# Patient Record
Sex: MALE | Race: WHITE | Employment: OTHER | URBAN - METROPOLITAN AREA
[De-identification: names, ages, dates, MRNs, and addresses within clinical notes are randomized per-mention and may not be internally consistent; named-entity substitution may affect disease eponyms.]

---

## 2017-01-03 ENCOUNTER — HOSPITAL ENCOUNTER (OUTPATIENT)
Dept: WOUND CARE | Age: 72
Discharge: HOME | End: 2017-01-03
Payer: MEDICARE

## 2017-01-03 PROCEDURE — 97597 DBRDMT OPN WND 1ST 20 CM/<: CPT | Performed by: PODIATRIST

## 2017-01-03 PROCEDURE — 97602 WOUND(S) CARE NON-SELECTIVE: CPT | Performed by: PODIATRIST

## 2017-01-03 RX ORDER — LIDOCAINE HYDROCHLORIDE 20 MG/ML
JELLY TOPICAL AS NEEDED
Status: DISCONTINUED | OUTPATIENT
Start: 2017-01-03 | End: 2017-01-07 | Stop reason: HOSPADM

## 2017-01-03 RX ORDER — GLUCOSAMINE/CHONDR SU A SOD 750-600 MG
5 TABLET ORAL
COMMUNITY

## 2017-01-03 RX ADMIN — LIDOCAINE HYDROCHLORIDE: 20 JELLY TOPICAL at 14:00

## 2017-01-03 NOTE — PROGRESS NOTES
Wound Center  Progress Note      Subjective:   Abdi Hargrove is a 80 y.o.  male for follow up new onset right foot and calf ulceration, and prior left TMA. Denies overnight n/f/v/c.      Offloading wound: no  Appetite: good  Wound associated pain: mild  Diabetic: yes  Smoker: no      There have been no changes in patient's medical history in the interim.      ROS:  No fever or chills. No rash. No pain at site of wound      Objective:   Vital Signs: T: 97.2 P: 72 RR: 20 BP: 117/71  General: alert, well developed, cooperative or pleasant  Psych: Cooperative, no anxiety or depression  Neuro: Alert, oriented to person/place/situation. Otherwise nonfocal.  Extremities: Bilateral moderate pitting edema is noted. Calves are supple and nontender. No cording. Hair present. Skin color is darkened hue. Vascular exam: Capillary refill is intact, <3sec. R DP pulse : 1+  R PT pulse: 0  L DP pulse : 1+  L PT pulse: 0          Ulcer Description:   Etiology: combined  Location: R lower leg lateral  Measurement: 7.3 x 1.6 x 0.2 cm  Ulcer bed: Granular/Healthy   Periwound: Edematous  Exudate: None: wound tissue dry  Odor: none      Ulcer Description:   Etiology: combined  Location: Lt. foot dorsal  Measurement: 1 x 0.8 x 0.1 cm  Ulcer bed: Mixed Granular/Fibrotic   Periwound: Edematous, nontender  Exudate: None: wound tissue dry  Odor: none      Ulcer Description:   Etiology: combined  Location: L heel  Measurement: 0.9 x 1.4 x 0.3 cm  Ulcer bed: Mixed Granular/Fibrotic   Periwound: Edematous, nontender  Exudate: None: wound tissue dry  Odor: none          Assessment:  80 y.o. 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       Plan:  1. Dressing: 3 layer compression wrap with Santyl Frequency: three times a week  2.  Debridement included excisional debridement skin to include sub Q tissues with curette         Plan is reviewed with patient who expresses understanding. Questions were answered. Patient is to follow up with me in 1 wk.

## 2017-01-12 ENCOUNTER — HOSPITAL ENCOUNTER (OUTPATIENT)
Dept: WOUND CARE | Age: 72
Discharge: HOME | End: 2017-01-12
Payer: MEDICARE

## 2017-01-12 PROCEDURE — 97597 DBRDMT OPN WND 1ST 20 CM/<: CPT

## 2017-01-12 RX ORDER — LIDOCAINE HYDROCHLORIDE 20 MG/ML
JELLY TOPICAL AS NEEDED
Status: DISCONTINUED | OUTPATIENT
Start: 2017-01-12 | End: 2017-01-16 | Stop reason: HOSPADM

## 2017-01-12 RX ADMIN — LIDOCAINE HYDROCHLORIDE: 20 JELLY TOPICAL at 10:00

## 2017-01-12 NOTE — PROGRESS NOTES
[id number]   WOUND CARE PROGRESS NOTE       Name:  Michel iVck   MR#:  [id number]   :  1945   Account #:  [de-identified]        Date of Adm:  2017       DATE OF SERVICE:  2017    HISTORY OF PRESENT ILLNESS: The patient follows up for bilateral   lower extremity wounds. He is normally followed by Dr. Cayden Guadalupe but   missed his appointment 2 days ago due to the weather. He is being   treated with 3-layer compression wrap to the right lower leg and foot   with Aquacel AG applied to the wound bed. He is treating the left heel   and dorsal foot wounds with Santyl ointment and a dry dressing daily   with Tubigrip applied over that. He has no complaints and no change in   his history since his last visit. PHYSICAL EXAMINATION   GENERAL: On exam, he is a pleasant, obese male. VITAL SIGNS: Afebrile with stable vital signs. SKIN: There is an ulcer on the lateral right distal lower leg which   measures 6.4 x 1.4 x 0.4 cm today, compared to 7.3 x 1.6 x 0.4 cm last   week. The wound is 50% clean granulation and 50% yellow or brown   nonviable tissue. The wound was selectively debrided using a curette   to scrape away and cut away the nonviable tissue. Following   debridement, the wound is 100% clean granulation and extends into   the exposed subcutaneous fat. Aquacel AG was applied to the wound   and a 3-layer compression wrap was applied to the right lower leg and   foot. There is an ulcer on the left heel which measures 1.0 x 1.8 x 0.4 cm   today, compared to last week when it was 0.9 x 1.4 x 0.3 cm. The   wound is 50% clean granulation and 50% yellow or tan nonviable fat. The wound was selectively debrided using a curette to scrape away   and cut away the nonviable tissue. Following debridement, the wound   is 80% granulation and 20% clean, pale fat. The wound extends into   the exposed subcutaneous fat layer. On the dorsum of the left foot, there is a small ulcer which measures   0.7 x 0.7 x 0.2 cm. This is smaller than last week. The wound is 100%   clean granulation and extends into the exposed subcutaneous fat   layer. Santyl ointment, dry dressings, and a Tubigrip were applied to   the left foot wounds in lower leg. ASSESSMENT AND PLAN: The patient is making good progress with   his current wound care. He will continue this and follow up with Dr. Bull Sommer next week to change the wrap and check the wounds.          Azeem Suárez (Cheri Morrison) Etha Gitelman, MD      WT / ALAYNA   D:  01/12/2017   12:26   T:  01/12/2017   13:10   Job #:  [id number]

## 2017-01-17 ENCOUNTER — HOSPITAL ENCOUNTER (OUTPATIENT)
Dept: WOUND CARE | Age: 72
Discharge: HOME | End: 2017-01-17
Payer: MEDICARE

## 2017-01-17 PROCEDURE — 97602 WOUND(S) CARE NON-SELECTIVE: CPT | Performed by: PODIATRIST

## 2017-01-17 PROCEDURE — 97597 DBRDMT OPN WND 1ST 20 CM/<: CPT | Performed by: PODIATRIST

## 2017-01-17 RX ORDER — LIDOCAINE HYDROCHLORIDE 20 MG/ML
JELLY TOPICAL AS NEEDED
Status: DISCONTINUED | OUTPATIENT
Start: 2017-01-17 | End: 2017-01-21 | Stop reason: HOSPADM

## 2017-01-17 RX ADMIN — LIDOCAINE HYDROCHLORIDE: 20 JELLY TOPICAL at 14:25

## 2017-01-17 NOTE — PROGRESS NOTES
Wound Center  Progress Note      Subjective:   Hayley Montes is a 80 y.o.  male for follow up new onset right foot and calf ulceration, and prior left TMA. Denies overnight n/f/v/c.      Offloading wound: no  Appetite: good  Wound associated pain: mild  Diabetic: yes  Smoker: no      There have been no changes in patient's medical history in the interim.      ROS:  No fever or chills. No rash. No pain at site of wound      Objective:   Vital Signs: T: 97.6P: 67 RR: 18 BP: 127/69  General: alert, well developed, cooperative or pleasant  Psych: Cooperative, no anxiety or depression  Neuro: Alert, oriented to person/place/situation. Otherwise nonfocal.  Extremities: Bilateral moderate pitting edema is noted. Calves are supple and nontender. No cording. Hair present. Skin color is darkened hue. Vascular exam: Capillary refill is intact, <3sec. R DP pulse : 1+  R PT pulse: 0  L DP pulse : 1+  L PT pulse: 0          Ulcer Description:   Etiology: combined  Location: R lower leg lateral  Measurement: 6.6 x 1.1 x 0.2 cm  Ulcer bed: Granular/Healthy   Periwound: Edematous  Exudate: None: wound tissue dry  Odor: none      Ulcer Description:   Etiology: combined  Location: Lt. foot dorsal  Measurement: 0.9 x 0.7 x 0.1 cm  Ulcer bed: Mixed Granular/Fibrotic   Periwound: Edematous, nontender  Exudate: None: wound tissue dry  Odor: none      Ulcer Description:   Etiology: combined  Location: L heel  Measurement: 1.3 x 1.9 x 0.3 cm  Ulcer bed: Mixed Granular/Fibrotic   Periwound: Edematous, nontender  Exudate: None: wound tissue dry  Odor: none          Assessment:  80 y.o. 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       Plan:  1. Dressing: 3 layer compression wrap with Santyl Frequency: three times a week  2.  Debridement included excisional debridement skin to include sub Q tissues with curette         Plan is reviewed with patient who expresses understanding. Questions were answered. Patient is to follow up with me in 1 wk.

## 2017-01-24 ENCOUNTER — HOSPITAL ENCOUNTER (OUTPATIENT)
Dept: WOUND CARE | Age: 72
Discharge: HOME | End: 2017-01-24
Payer: MEDICARE

## 2017-01-24 PROCEDURE — 97597 DBRDMT OPN WND 1ST 20 CM/<: CPT | Performed by: PODIATRIST

## 2017-01-24 NOTE — PROGRESS NOTES
Wound Center  Progress Note      Subjective:   Angela Bass is a 80 y.o.  male for follow up new onset right foot and calf ulceration, and prior left TMA. Denies overnight n/f/v/c.      Offloading wound: no  Appetite: good  Wound associated pain: mild  Diabetic: yes  Smoker: no      There have been no changes in patient's medical history in the interim.      ROS:  No fever or chills. No rash. No pain at site of wound      Objective:   Vital Signs: T: 97.2 P: 77 RR: 20 BP: 118/72  General: alert, well developed, cooperative or pleasant  Psych: Cooperative, no anxiety or depression  Neuro: Alert, oriented to person/place/situation. Otherwise nonfocal.  Extremities: Bilateral moderate pitting edema is noted. Calves are supple and nontender. No cording. Hair present. Skin color is darkened hue. Vascular exam: Capillary refill is intact, <3sec. R DP pulse : 1+  R PT pulse: 0  L DP pulse : 1+  L PT pulse: 0          Ulcer Description:   Etiology: combined  Location: R lower leg lateral  Measurement: 0.6 x 1.1 x 0.2 cm  Ulcer bed: Granular/Healthy   Periwound: Edematous  Exudate: None: wound tissue dry  Odor: none      Ulcer Description:   Etiology: combined  Location: Lt. foot dorsal  Measurement: 0.9 x 0.8 x 0.1 cm  Ulcer bed: Mixed Granular  Periwound: Edematous, nontender  Exudate: None: wound tissue dry  Odor: none      Ulcer Description:   Etiology: combined  Location: L heel  Measurement: 1.5 x 1.7 x 0.3 cm  Ulcer bed: Mixed Granular/Fibrotic   Periwound: Edematous, nontender  Exudate: None: wound tissue dry  Odor: none          Assessment:  80 y.o. 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       Plan:  1. Dressing: 3 layer compression wrap with Santyl Frequency: three times a week  2. Debridement included excisional debridement skin to include sub Q tissues with curette         Plan is reviewed with patient who expresses understanding. Questions were answered. Patient is to follow up with me in 2 wks.

## 2017-01-31 ENCOUNTER — HOSPITAL ENCOUNTER (OUTPATIENT)
Dept: WOUND CARE | Age: 72
Discharge: HOME | End: 2017-01-31
Payer: MEDICARE

## 2017-01-31 PROCEDURE — 97597 DBRDMT OPN WND 1ST 20 CM/<: CPT | Performed by: PODIATRIST

## 2017-01-31 RX ORDER — LIDOCAINE HYDROCHLORIDE 40 MG/ML
SOLUTION TOPICAL ONCE
Status: COMPLETED | OUTPATIENT
Start: 2017-01-31 | End: 2017-01-31

## 2017-01-31 RX ADMIN — LIDOCAINE HYDROCHLORIDE: 40 SOLUTION TOPICAL at 15:00

## 2017-01-31 NOTE — PROGRESS NOTES
Wound Center  Progress Note      Subjective:   Pieter Amaya is a 80 y.o.  male for follow up new onset right foot and calf ulceration, and prior left TMA. Denies overnight n/f/v/c.      Offloading wound: no  Appetite: good  Wound associated pain: mild  Diabetic: yes  Smoker: no      There have been no changes in patient's medical history in the interim.      ROS:  No fever or chills. No rash. No pain at site of wound      Objective:   Vital Signs: T: 97.5 P: 78 RR: 17 BP: 92/65  General: alert, well developed, cooperative or pleasant  Psych: Cooperative, no anxiety or depression  Neuro: Alert, oriented to person/place/situation. Otherwise nonfocal.  Extremities: Bilateral moderate pitting edema is noted. Calves are supple and nontender. No cording. Hair present. Skin color is darkened hue. Vascular exam: Capillary refill is intact, <3sec. R DP pulse : 1+  R PT pulse: 0  L DP pulse : 1+  L PT pulse: 0          Ulcer Description:   Etiology: combined  Location: Lt. foot dorsal  Measurement: 1.5 x 0.8 x 0.1 cm  Ulcer bed: Mixed Granular  Periwound: Edematous, nontender  Exudate: None: wound tissue dry  Odor: none      Ulcer Description:   Etiology: combined  Location: L heel  Measurement: 1.6 x 1.9 x 0.3 cm  Ulcer bed: Mixed Granular/Fibrotic   Periwound: Edematous, nontender  Exudate: None: wound tissue dry  Odor: none          Assessment:  80 y.o. 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       Plan:  1. Dressing: 3 layer compression wrap with Santyl Frequency: three times a week  2. Debridement included excisional debridement skin to include sub Q tissues with curette         Plan is reviewed with patient who expresses understanding. Questions were answered. Patient is to follow up with me in 2 wks.

## 2017-02-07 ENCOUNTER — HOSPITAL ENCOUNTER (OUTPATIENT)
Dept: WOUND CARE | Age: 72
Discharge: HOME | End: 2017-02-07
Payer: MEDICARE

## 2017-02-07 PROBLEM — L97.303: Status: ACTIVE | Noted: 2017-02-07

## 2017-02-07 PROCEDURE — 11043 DBRDMT MUSC&/FSCA 1ST 20/<: CPT | Performed by: PODIATRIST

## 2017-02-07 RX ORDER — LIDOCAINE HYDROCHLORIDE 20 MG/ML
JELLY TOPICAL AS NEEDED
Status: DISCONTINUED | OUTPATIENT
Start: 2017-02-07 | End: 2017-02-11 | Stop reason: HOSPADM

## 2017-02-07 RX ADMIN — LIDOCAINE HYDROCHLORIDE: 20 JELLY TOPICAL at 14:24

## 2017-02-07 NOTE — PROGRESS NOTES
Wound Center  Progress Note      Subjective:   Corazon Mansfield is a 80 y.o.  male for follow up new onset right foot and calf ulceration, and prior left TMA. Denies overnight n/f/v/c.      Offloading wound: no  Appetite: good  Wound associated pain: mild  Diabetic: yes  Smoker: no      There have been no changes in patient's medical history in the interim.      ROS:  No fever or chills. No rash. No pain at site of wound      Objective:   Vital Signs: T: 97.8 P: 89 RR: 19 BP: 105/64  General: alert, well developed, cooperative or pleasant  Psych: Cooperative, no anxiety or depression  Neuro: Alert, oriented to person/place/situation. Otherwise nonfocal.  Extremities: Bilateral moderate pitting edema is noted. Calves are supple and nontender. No cording. Hair present. Skin color is darkened hue. Vascular exam: Capillary refill is intact, <3sec. R DP pulse : 1+  R PT pulse: 0  L DP pulse : 1+  L PT pulse: 0          Ulcer Description:   Etiology: combined  Location: Lt. foot dorsal  Measurement: 0.8 x 0.8 x 0.1 cm  Ulcer bed: Mixed Granular  Periwound: Edematous, nontender  Exudate: None: wound tissue dry  Odor: none      Ulcer Description:   Etiology: combined  Location: L heel  Measurement: 1.2x 1.6 x 0.3 cm  Ulcer bed: Mixed Granular/Fibrotic   Periwound: Edematous, nontender  Exudate: None: wound tissue dry  Odor: none          Assessment:  80 y.o. 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       Plan:  1. Dressing: 3 layer compression wrap with Santyl Frequency: three times a week  2. Debridement included excisional debridement skin to include sub Q tissues with curette         Plan is reviewed with patient who expresses understanding. Questions were answered. Patient is to follow up with me in 2 wks.

## 2017-02-14 ENCOUNTER — HOSPITAL ENCOUNTER (OUTPATIENT)
Dept: WOUND CARE | Age: 72
Discharge: HOME | End: 2017-02-14
Payer: MEDICARE

## 2017-02-14 PROCEDURE — 97597 DBRDMT OPN WND 1ST 20 CM/<: CPT | Performed by: PODIATRIST

## 2017-02-14 PROCEDURE — 29581 APPL MULTLAYER CMPRN SYS LEG: CPT | Performed by: PODIATRIST

## 2017-02-21 ENCOUNTER — HOSPITAL ENCOUNTER (OUTPATIENT)
Dept: WOUND CARE | Age: 72
Discharge: HOME | End: 2017-02-21
Payer: MEDICARE

## 2017-02-21 PROCEDURE — 29581 APPL MULTLAYER CMPRN SYS LEG: CPT | Performed by: PODIATRIST

## 2017-02-21 PROCEDURE — 97597 DBRDMT OPN WND 1ST 20 CM/<: CPT | Performed by: PODIATRIST

## 2017-02-21 RX ORDER — LIDOCAINE HYDROCHLORIDE 20 MG/ML
JELLY TOPICAL ONCE
Status: COMPLETED | OUTPATIENT
Start: 2017-02-21 | End: 2017-02-21

## 2017-02-21 RX ADMIN — LIDOCAINE HYDROCHLORIDE: 20 JELLY TOPICAL at 13:45

## 2017-02-21 NOTE — PROGRESS NOTES
Wound Center  Progress Note      Subjective:   Emely Swanson is a 80 y.o.  male for follow up new onset right foot and calf ulceration, and prior left TMA. Denies overnight n/f/v/c.      Offloading wound: no  Appetite: good  Wound associated pain: mild  Diabetic: yes  Smoker: no      There have been no changes in patient's medical history in the interim.      ROS:  No fever or chills. No rash. No pain at site of wound      Objective:   Vital Signs: T: 97.2 P: 87 RR: 18 BP: 110/60  General: alert, well developed, cooperative or pleasant  Psych: Cooperative, no anxiety or depression  Neuro: Alert, oriented to person/place/situation. Otherwise nonfocal.  Extremities: Bilateral moderate pitting edema is noted. Calves are supple and nontender. No cording. Hair present. Skin color is darkened hue. Vascular exam: Capillary refill is intact, <3sec. R DP pulse : 1+  R PT pulse: 0  L DP pulse : 1+  L PT pulse: 0          Ulcer Description:   Etiology: combined  Location: Lt. foot dorsal  Measurement: 1.4 x 0.8 x 0.1 cm  Ulcer bed: Mixed Granular  Periwound: Edematous, nontender  Exudate: None: wound tissue dry  Odor: none      Ulcer Description:   Etiology: combined  Location: L heel  Measurement: 1.2 x 0.9 x 0.1 cm  Ulcer bed: Mixed Granular/Fibrotic   Periwound: Edematous, nontender  Exudate: None: wound tissue dry  Odor: none          Assessment:  80 y.o. 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       Plan:  1. Dressing: 3 layer compression wrap with Santyl Frequency: three times a week  2. Debridement included excisional debridement skin to include sub Q tissues with curette         Plan is reviewed with patient who expresses understanding. Questions were answered.   Patient is to follow up with me in 1 wk

## 2017-02-28 ENCOUNTER — HOSPITAL ENCOUNTER (OUTPATIENT)
Dept: WOUND CARE | Age: 72
Discharge: HOME | End: 2017-02-28
Payer: MEDICARE

## 2017-02-28 PROCEDURE — 29581 APPL MULTLAYER CMPRN SYS LEG: CPT | Performed by: PODIATRIST

## 2017-02-28 PROCEDURE — 97597 DBRDMT OPN WND 1ST 20 CM/<: CPT | Performed by: PODIATRIST

## 2017-02-28 RX ORDER — WARFARIN 3 MG/1
3 TABLET ORAL DAILY
COMMUNITY

## 2017-02-28 NOTE — PROGRESS NOTES
Wound Center  Progress Note      Subjective:   Anjel Mendez is a 80 y.o.  male for follow up new onset right foot and calf ulceration, and prior left TMA. Denies overnight n/f/v/c.      Offloading wound: no  Appetite: good  Wound associated pain: mild  Diabetic: yes  Smoker: no      There have been no changes in patient's medical history in the interim.      ROS:  No fever or chills. No rash. No pain at site of wound      Objective:   Vital Signs: T: 97.3 P: 86 RR: 18 BP: 113/63  General: alert, well developed, cooperative or pleasant  Psych: Cooperative, no anxiety or depression  Neuro: Alert, oriented to person/place/situation. Otherwise nonfocal.  Extremities: Bilateral moderate pitting edema is noted. Calves are supple and nontender. No cording. Hair present. Skin color is darkened hue. Vascular exam: Capillary refill is intact, <3sec. R DP pulse : 1+  R PT pulse: 0  L DP pulse : 1+  L PT pulse: 0          Ulcer Description:   Etiology: combined  Location: Lt. foot dorsal  Measurement: 0.5 x 0.4 x 0.1 cm  Ulcer bed: Granular  Periwound: Edematous, nontender  Exudate: None: wound tissue dry  Odor: none      Ulcer Description:   Etiology: combined  Location: L heel  Measurement: 0.8 x 0.9 x 0.1 cm  Ulcer bed: Mixed Granular/Fibrotic   Periwound: Edematous, nontender  Exudate: None: wound tissue dry  Odor: none          Assessment:  80 y.o. 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       Plan:  1. Dressing: 3 layer compression wrap with Santyl Frequency: three times a week  2. Debridement included excisional debridement skin to include sub Q tissues with curette         Plan is reviewed with patient who expresses understanding. Questions were answered.   Patient is to follow up with me in 1 wk

## 2017-03-07 ENCOUNTER — HOSPITAL ENCOUNTER (OUTPATIENT)
Dept: WOUND CARE | Age: 72
Discharge: HOME | End: 2017-03-07
Payer: MEDICARE

## 2017-03-07 PROCEDURE — 97597 DBRDMT OPN WND 1ST 20 CM/<: CPT | Performed by: PODIATRIST

## 2017-03-07 RX ORDER — LIDOCAINE HYDROCHLORIDE 20 MG/ML
JELLY TOPICAL ONCE
Status: COMPLETED | OUTPATIENT
Start: 2017-03-07 | End: 2017-03-07

## 2017-03-07 RX ADMIN — LIDOCAINE HYDROCHLORIDE: 20 JELLY TOPICAL at 13:00

## 2017-03-07 NOTE — PROGRESS NOTES
Progress Note    Subjective:   Narcisa Leung is a 80 y.o.  male for follow up of Left   Venous  ulcer to the Ankle To Muscle  Ulcer has been present for/since 3+ months    Doing well. No concerns. Wound care going well. Offloading wound: n/a  Appetite: good  Wound associated pain: mild  Diabetic: yes  Type 2   Insulin Dependent  Smoker: yes    ROS: no N/V, no T/chills; no local rash  There have been no changes in patient's medical history in the interim. Objective:   T: 97.5 P: 73  RR: 20  BP: 99/63   General: well developed, well nourished, pleasant , NAD. Hygiene good  Psych: cooperative. Pleasant. No anxiety or depression. Normal mood and affect. Neuro: alert and oriented to person/place/situation. Otherwise nonfocal.  HEENT: Normocephalic, atraumatic. EOMI. Conjunctiva clear. No scleral icterus. Neck: Normal range of motion. No masses. Chest: Good chest expansion bilat  Abdomen: Soft, nontender, nondistended, normoactive bowel sounds  Lower extremities: color normal; temperature normal. Hair growth is not present. Calves are supple, nontender, approximately equally sized in comparison. Capillary refill <3 sec  Focused Lower Extremity Exam:  Vascular exam:  Bilateral lower extremity: Pitting   edema, foot ,   DP pulse :1+  PT pulse: +1  Nails dystrophic     Ulcer Description:   Etiology:Venous   Location: Ankle  Measurement: 0.8 x 0.7 x 0.1 cm  Ulcer bed: Mixed Granular/Fibrotic    Periwound: Reddened  Exudate: Serous      Ulcer Description:   Etiology:Venous   Location: Dorsal foot  Measurement: 0.3 x 0.3 x 0.1 cm  Ulcer bed: Granular/Healthy    Periwound: Normal  Exudate: Serous     Assessment/Plan   80 y.o. male with Venous ulceration to the left dorsal foot , and left posterior achilles/ heel.- improving weekly. .    Dressing: Hydrofera blue to the left heel every 3rd day, and xeroform to the dorsal foot wound.    Frequency: three times a week     Selective debridement of the left posterior heel ulcer with a #15 blade. Patient understood and agrees with plan. Questions answered. Weekly visits and serial debridements also discussed. Follow up with me in 1 week.

## 2017-03-14 ENCOUNTER — HOSPITAL ENCOUNTER (OUTPATIENT)
Dept: WOUND CARE | Age: 72
Discharge: HOME | End: 2017-03-14
Payer: MEDICARE

## 2017-03-14 PROCEDURE — 97597 DBRDMT OPN WND 1ST 20 CM/<: CPT | Performed by: PODIATRIST

## 2017-03-14 RX ORDER — LIDOCAINE HYDROCHLORIDE 20 MG/ML
JELLY TOPICAL AS NEEDED
Status: DISCONTINUED | OUTPATIENT
Start: 2017-03-14 | End: 2017-03-18 | Stop reason: HOSPADM

## 2017-03-14 RX ADMIN — LIDOCAINE HYDROCHLORIDE: 20 JELLY TOPICAL at 12:52

## 2017-03-14 NOTE — PROGRESS NOTES
Progress Note    Subjective:   Preet Quinones is a 80 y.o.  male for follow up of Left   Venous  ulcer to the Ankle To Muscle  Ulcer has been present for/since 3+ months    Doing well. No concerns. Wound care going well. Offloading wound: n/a  Appetite: good  Wound associated pain: mild  Diabetic: yes  Type 2   Insulin Dependent  Smoker: yes    ROS: no N/V, no T/chills; no local rash  There have been no changes in patient's medical history in the interim. Objective:   T: 97 P: 73  RR: 18  BP: 114/63   General: well developed, well nourished, pleasant , NAD. Hygiene good  Psych: cooperative. Pleasant. No anxiety or depression. Normal mood and affect. Neuro: alert and oriented to person/place/situation. Otherwise nonfocal.  HEENT: Normocephalic, atraumatic. EOMI. Conjunctiva clear. No scleral icterus. Neck: Normal range of motion. No masses. Chest: Good chest expansion bilat  Abdomen: Soft, nontender, nondistended, normoactive bowel sounds  Lower extremities: color normal; temperature normal. Hair growth is not present. Calves are supple, nontender, approximately equally sized in comparison. Capillary refill <3 sec  Focused Lower Extremity Exam:  Vascular exam:  Bilateral lower extremity: Pitting   edema, foot ,   DP pulse :1+  PT pulse: +1  Nails dystrophic     Ulcer Description:   Etiology:Venous   Location: Ankle  Measurement: 0.8 x 0.5 x 0.1 cm  Ulcer bed: Mixed Granular    Periwound: normal  Exudate: Serous        Assessment/Plan   80 y.o. male with Venous ulceration to the left posterior achilles/ heel.- improving weekly. .    Dressing: Hydrofera blue to the left heel every 3rd day  Frequency: three times a week     Selective debridement of the left posterior heel ulcer with a #15 blade. Patient understood and agrees with plan. Questions answered. Weekly visits and serial debridements also discussed. Follow up with me in 1 week.

## 2017-03-21 ENCOUNTER — HOSPITAL ENCOUNTER (OUTPATIENT)
Dept: WOUND CARE | Age: 72
Discharge: HOME | End: 2017-03-21
Payer: MEDICARE

## 2017-03-21 PROCEDURE — 99212 OFFICE O/P EST SF 10 MIN: CPT | Performed by: PODIATRIST

## 2017-03-21 NOTE — PROGRESS NOTES
Progress Note    Subjective:   Nathalie Strauss is a 71 y.o.  male for follow up of Left   Venous  ulcer to the Ankle To Muscle  Ulcer has been present for/since 3+ months    Doing well. No concerns. Wound care going well. Offloading wound: n/a  Appetite: good  Wound associated pain: mild  Diabetic: yes  Type 2   Insulin Dependent  Smoker: yes    ROS: no N/V, no T/chills; no local rash  There have been no changes in patient's medical history in the interim. Objective:   T: 97.1 P: 96  RR: 20  BP: 122/69   General: well developed, well nourished, pleasant , NAD. Hygiene good  Psych: cooperative. Pleasant. No anxiety or depression. Normal mood and affect. Neuro: alert and oriented to person/place/situation. Otherwise nonfocal.  HEENT: Normocephalic, atraumatic. EOMI. Conjunctiva clear. No scleral icterus. Neck: Normal range of motion. No masses. Chest: Good chest expansion bilat  Abdomen: Soft, nontender, nondistended, normoactive bowel sounds  Lower extremities: color normal; temperature normal. Hair growth is not present. Calves are supple, nontender, approximately equally sized in comparison. Capillary refill <3 sec  Focused Lower Extremity Exam:  Vascular exam:  Bilateral lower extremity: Pitting   edema, foot ,   DP pulse :1+  PT pulse: +1  Nails dystrophic     Ulcer Description:   Etiology:Venous   Location: Ankle  Measurement: scabbed over  Ulcer bed: Mixed Granular    Periwound: normal  Exudate: none      Assessment/Plan   71 y.o. male with Venous ulceration to the left posterior achilles/ heel.- improving weekly. .    Dressing: Mepilex bandage left heel every 3rd day  Frequency: three times a week   Continue compression stockings    Patient understood and agrees with plan. Questions answered. Weekly visits and serial debridements also discussed. Follow up with me in 1 week.

## 2017-03-28 ENCOUNTER — HOSPITAL ENCOUNTER (OUTPATIENT)
Dept: WOUND CARE | Age: 72
Discharge: HOME | End: 2017-03-28
Payer: MEDICARE

## 2017-03-28 PROCEDURE — 99212 OFFICE O/P EST SF 10 MIN: CPT | Performed by: PODIATRIST

## 2017-03-28 NOTE — PROGRESS NOTES
Progress Note    Subjective:   Brittanie Rehman is a 71 y.o.  male for follow up of Left   Venous  ulcer to the Ankle To Muscle  Ulcer has been present for/since 4+ months    Doing well. No concerns. Wound care going well. Offloading wound: n/a  Appetite: good  Wound associated pain: mild  Diabetic: yes  Type 2   Insulin Dependent  Smoker: yes    ROS: no N/V, no T/chills; no local rash  There have been no changes in patient's medical history in the interim. Objective:   T: 98.1 P: 92  RR:  BP: 98/68   General: well developed, well nourished, pleasant , NAD. Hygiene good  Psych: cooperative. Pleasant. No anxiety or depression. Normal mood and affect. Neuro: alert and oriented to person/place/situation. Otherwise nonfocal.  HEENT: Normocephalic, atraumatic. EOMI. Conjunctiva clear. No scleral icterus. Neck: Normal range of motion. No masses. Chest: Good chest expansion bilat  Abdomen: Soft, nontender, nondistended, normoactive bowel sounds  Lower extremities: color normal; temperature normal. Hair growth is not present. Calves are supple, nontender, approximately equally sized in comparison. Capillary refill <3 sec  Focused Lower Extremity Exam:  Vascular exam:  Bilateral lower extremity: Pitting   edema, foot ,   DP pulse :1+  PT pulse: +1  Nails dystrophic     Ulcer Description:   Etiology:Venous   Location: Ankle  Measurement: 0.2x0.2x0.1cm  Ulcer bed: Mixed Granular    Periwound: normal  Exudate: none      Assessment/Plan   71 y.o. male with Venous ulceration to the left posterior achilles/ heel.- improving weekly. .    Dressing: gauze dressing only  Frequency: three times a week   Continue compression stockings    Patient understood and agrees with plan. Questions answered. Weekly visits and serial debridements also discussed. Follow up with me in 1 week.

## 2017-04-18 ENCOUNTER — HOSPITAL ENCOUNTER (OUTPATIENT)
Dept: WOUND CARE | Age: 72
Discharge: HOME | End: 2017-04-18
Payer: MEDICARE

## 2017-04-18 PROCEDURE — 99212 OFFICE O/P EST SF 10 MIN: CPT | Performed by: PODIATRIST

## 2017-04-18 NOTE — PROGRESS NOTES
Progress Note    Subjective:   Karin Bryson is a 71 y.o.  male for follow up of Left   Venous  ulcer to the Ankle To Muscle  Ulcer has been present for/since 4+ months    Doing well. No concerns. Wound care going well. Offloading wound: n/a  Appetite: good  Wound associated pain: mild  Diabetic: yes  Type 2   Insulin Dependent  Smoker: yes    ROS: no N/V, no T/chills; no local rash  There have been no changes in patient's medical history in the interim. Objective:   T: 97.8 P: 88  RR: 16 BP: 110/72   General: well developed, well nourished, pleasant , NAD. Hygiene good  Psych: cooperative. Pleasant. No anxiety or depression. Normal mood and affect. Neuro: alert and oriented to person/place/situation. Otherwise nonfocal.  HEENT: Normocephalic, atraumatic. EOMI. Conjunctiva clear. No scleral icterus. Neck: Normal range of motion. No masses. Chest: Good chest expansion bilat  Abdomen: Soft, nontender, nondistended, normoactive bowel sounds  Lower extremities: color normal; temperature normal. Hair growth is not present. Calves are supple, nontender, approximately equally sized in comparison. Capillary refill <3 sec  Focused Lower Extremity Exam:  Vascular exam:  Bilateral lower extremity: Pitting   edema, foot ,   DP pulse :1+  PT pulse: +1  Nails dystrophic     Ulcer Description:   Etiology:Venous   Location: Ankle  Measurement: healed      Assessment/Plan   71 y.o. male with Venous ulceration to the left posterior achilles/ heel.- improving weekly. .    Dressing: gauze dressing only  Frequency: three times a week   Continue compression stockings    Patient understood and agrees with plan. Questions answered.     Ok to follow up as an outpatient at [address] [place] [address]

## 2017-05-18 ENCOUNTER — HOSPITAL ENCOUNTER (EMERGENCY)
Age: 72
Discharge: HOME | End: 2017-05-18
Attending: EMERGENCY MEDICINE | Admitting: EMERGENCY MEDICINE
Payer: MEDICARE

## 2017-05-18 ENCOUNTER — APPOINTMENT (OUTPATIENT)
Dept: GENERAL RADIOLOGY | Age: 72
End: 2017-05-18
Attending: EMERGENCY MEDICINE
Payer: MEDICARE

## 2017-05-18 VITALS
RESPIRATION RATE: 17 BRPM | DIASTOLIC BLOOD PRESSURE: 53 MMHG | TEMPERATURE: 98.2 F | OXYGEN SATURATION: 96 % | BODY MASS INDEX: 45.1 KG/M2 | HEIGHT: 70 IN | SYSTOLIC BLOOD PRESSURE: 110 MMHG | WEIGHT: 315 LBS | HEART RATE: 81 BPM

## 2017-05-18 DIAGNOSIS — N30.00 ACUTE CYSTITIS WITHOUT HEMATURIA: Primary | ICD-10-CM

## 2017-05-18 LAB
ALBUMIN SERPL BCP-MCNC: 3.2 G/DL (ref 3.5–5)
ALBUMIN/GLOB SERPL: 0.8 {RATIO} (ref 1.1–2.2)
ALP SERPL-CCNC: 103 U/L (ref 45–117)
ALT SERPL-CCNC: 21 U/L (ref 12–78)
ANION GAP BLD CALC-SCNC: 8 MMOL/L (ref 5–15)
APPEARANCE UR: ABNORMAL
AST SERPL W P-5'-P-CCNC: 17 U/L (ref 15–37)
ATRIAL RATE: 101 BPM
BACTERIA URNS QL MICRO: ABNORMAL /HPF
BASOPHILS # BLD AUTO: 0 K/UL (ref 0–0.1)
BASOPHILS # BLD: 0 % (ref 0–1)
BILIRUB SERPL-MCNC: 0.7 MG/DL (ref 0.2–1)
BILIRUB UR QL: NEGATIVE
BNP SERPL-MCNC: 166 PG/ML (ref 0–125)
BUN SERPL-MCNC: 37 MG/DL (ref 6–20)
BUN/CREAT SERPL: 11 (ref 12–20)
CALCIUM SERPL-MCNC: 7.9 MG/DL (ref 8.5–10.1)
CALCULATED P AXIS, ECG09: 7 DEGREES
CALCULATED R AXIS, ECG10: -52 DEGREES
CALCULATED T AXIS, ECG11: 10 DEGREES
CHLORIDE SERPL-SCNC: 107 MMOL/L (ref 97–108)
CO2 SERPL-SCNC: 27 MMOL/L (ref 21–32)
COLOR UR: ABNORMAL
CREAT SERPL-MCNC: 3.46 MG/DL (ref 0.7–1.3)
DIAGNOSIS, 93000: NORMAL
EOSINOPHIL # BLD: 0.2 K/UL (ref 0–0.4)
EOSINOPHIL NFR BLD: 3 % (ref 0–7)
EPITH CASTS URNS QL MICRO: ABNORMAL /LPF
ERYTHROCYTE [DISTWIDTH] IN BLOOD BY AUTOMATED COUNT: 13.3 % (ref 11.5–14.5)
GLOBULIN SER CALC-MCNC: 4.1 G/DL (ref 2–4)
GLUCOSE SERPL-MCNC: 192 MG/DL (ref 65–100)
GLUCOSE UR STRIP.AUTO-MCNC: NEGATIVE MG/DL
HCT VFR BLD AUTO: 35.1 % (ref 36.6–50.3)
HGB BLD-MCNC: 11.7 G/DL (ref 12.1–17)
HGB UR QL STRIP: ABNORMAL
KETONES UR QL STRIP.AUTO: NEGATIVE MG/DL
LEUKOCYTE ESTERASE UR QL STRIP.AUTO: ABNORMAL
LYMPHOCYTES # BLD AUTO: 19 % (ref 12–49)
LYMPHOCYTES # BLD: 1.4 K/UL (ref 0.8–3.5)
MCH RBC QN AUTO: 31.8 PG (ref 26–34)
MCHC RBC AUTO-ENTMCNC: 33.3 G/DL (ref 30–36.5)
MCV RBC AUTO: 95.4 FL (ref 80–99)
MONOCYTES # BLD: 1.2 K/UL (ref 0–1)
MONOCYTES NFR BLD AUTO: 16 % (ref 5–13)
MUCOUS THREADS URNS QL MICRO: ABNORMAL /LPF
NEUTS SEG # BLD: 4.4 K/UL (ref 1.8–8)
NEUTS SEG NFR BLD AUTO: 62 % (ref 32–75)
NITRITE UR QL STRIP.AUTO: NEGATIVE
P-R INTERVAL, ECG05: 208 MS
PH UR STRIP: 5 [PH] (ref 5–8)
PLATELET # BLD AUTO: 187 K/UL (ref 150–400)
POTASSIUM SERPL-SCNC: 4 MMOL/L (ref 3.5–5.1)
PROT SERPL-MCNC: 7.3 G/DL (ref 6.4–8.2)
PROT UR STRIP-MCNC: 30 MG/DL
Q-T INTERVAL, ECG07: 354 MS
QRS DURATION, ECG06: 110 MS
QTC CALCULATION (BEZET), ECG08: 459 MS
RBC # BLD AUTO: 3.68 M/UL (ref 4.1–5.7)
RBC #/AREA URNS HPF: ABNORMAL /HPF (ref 0–5)
SODIUM SERPL-SCNC: 142 MMOL/L (ref 136–145)
SP GR UR REFRACTOMETRY: 1.01 (ref 1–1.03)
UROBILINOGEN UR QL STRIP.AUTO: 0.2 EU/DL (ref 0.2–1)
VENTRICULAR RATE, ECG03: 101 BPM
WBC # BLD AUTO: 7.2 K/UL (ref 4.1–11.1)
WBC URNS QL MICRO: >100 /HPF (ref 0–4)

## 2017-05-18 PROCEDURE — 96365 THER/PROPH/DIAG IV INF INIT: CPT

## 2017-05-18 PROCEDURE — 74011250636 HC RX REV CODE- 250/636: Performed by: EMERGENCY MEDICINE

## 2017-05-18 PROCEDURE — 80053 COMPREHEN METABOLIC PANEL: CPT | Performed by: EMERGENCY MEDICINE

## 2017-05-18 PROCEDURE — 85025 COMPLETE CBC W/AUTO DIFF WBC: CPT | Performed by: EMERGENCY MEDICINE

## 2017-05-18 PROCEDURE — 99285 EMERGENCY DEPT VISIT HI MDM: CPT

## 2017-05-18 PROCEDURE — 36415 COLL VENOUS BLD VENIPUNCTURE: CPT | Performed by: EMERGENCY MEDICINE

## 2017-05-18 PROCEDURE — 83880 ASSAY OF NATRIURETIC PEPTIDE: CPT | Performed by: EMERGENCY MEDICINE

## 2017-05-18 PROCEDURE — 93005 ELECTROCARDIOGRAM TRACING: CPT

## 2017-05-18 PROCEDURE — 74011000258 HC RX REV CODE- 258: Performed by: EMERGENCY MEDICINE

## 2017-05-18 PROCEDURE — 81001 URINALYSIS AUTO W/SCOPE: CPT | Performed by: EMERGENCY MEDICINE

## 2017-05-18 PROCEDURE — 71020 XR CHEST PA LAT: CPT

## 2017-05-18 RX ORDER — CEPHALEXIN 500 MG/1
1000 CAPSULE ORAL 2 TIMES DAILY
Qty: 28 CAP | Refills: 0 | Status: SHIPPED | OUTPATIENT
Start: 2017-05-18 | End: 2017-05-25

## 2017-05-18 RX ORDER — CEPHALEXIN 500 MG/1
1000 CAPSULE ORAL 2 TIMES DAILY
Qty: 28 CAP | Refills: 0 | Status: SHIPPED | OUTPATIENT
Start: 2017-05-18 | End: 2017-05-18

## 2017-05-18 RX ADMIN — SODIUM CHLORIDE 1000 ML: 900 INJECTION, SOLUTION INTRAVENOUS at 15:23

## 2017-05-18 RX ADMIN — CEFTRIAXONE SODIUM 1 G: 1 INJECTION, POWDER, FOR SOLUTION INTRAMUSCULAR; INTRAVENOUS at 15:22

## 2017-05-18 NOTE — DISCHARGE INSTRUCTIONS

## 2017-05-18 NOTE — ED NOTES
Patient arrived ambulatory with walker to ED accompanied by spouse reporting hypotension and increased SOB after being seen by Nephro MD Christopher Cameron today and referred to ED. Patient denies chest pain and all other complaints at this time.

## 2017-05-18 NOTE — ED NOTES
Discharge instructions reviewed with pt and copy given along with RX by ER MD Termeer. Patient discharged via wheelchair accompanied by spouse and ER PCT in no sign of distress or discomfort at this time.

## 2017-05-18 NOTE — ED PROVIDER NOTES
HPI Comments: Nishi Granado is a 71 y.o. male with pertinent PMHx of hypertension, CAD, CKD who presents ambulatory with spouse to ED c/o progressively worsening shortness of breath for the past week, along with associated hacking cough. Pt notes he went to his PCP 4 days ago for these symptoms and had SpO2 of 93 on room air, and he was noted to be hypotensive, but his Lisinopril and Lasix dosages were not changed. Pt notes he was at his Nephrologist's office today, and he was referred to the ED due to hypotension. Pt adds that he changes the bandages on his lower legs once a week, and he has chronic skin changes for which he sees wound care. He has chronic lower extremity swelling. He reports still making urine, and he usually puts out 3 liters of urine daily. Pt denies any fever, chills, chest pain, N/V/D. Nephrology: Dr Kiley Esposito: Dr Eva Perez  PCP:  Zana Gomez NP    Social Hx:  tobacco use (former), EtOH use (-)    There are no other complaints, changes or physical findings at this time. The history is provided by the patient. No  was used.         Past Medical History:   Diagnosis Date    CAD (coronary artery disease) 2008    MI     Chronic pain     BILAT KNEES    CKD (chronic kidney disease) stage 3, GFR 30-59 ml/min     Diabetes mellitus, type II ([address]     HTN (hypertension)     Hypercalcemia     Ill-defined condition     right calf wound followed by wound care    Morbid obesity ([address]     Neuropathy     all limbs    Osteoarthritis     Thromboembolus ([address] 2005    BLOOD CLOT TO RIGHT LEG-2005    Thyroid disease     4 CYSTS ON THYROID    Vitamin D deficiency        Past Surgical History:   Procedure Laterality Date    HX OTHER SURGICAL      PILONIDAL CYSTECTOMY AGE 19         Family History:   Problem Relation Age of Onset    Cancer Mother 27     unsure of what type of cancer    Arthritis-osteo Mother     Heart Disease Father      3 VESSEL BYPASS    Stroke Father     Anesth Problems Neg Hx        Social History     Social History    Marital status:      Spouse name: N/A    Number of children: N/A    Years of education: N/A     Occupational History    Not on file. Social History Main Topics    Smoking status: Former Smoker     Years: 25.00     Start date: 5/8/1973     Quit date: 5/29/2009    Smokeless tobacco: Never Used    Alcohol use No    Drug use: No    Sexual activity: Yes     Partners: Female     Other Topics Concern    Not on file     Social History Narrative         ALLERGIES: Review of patient's allergies indicates no known allergies. Review of Systems   Constitutional: Negative for activity change, appetite change, chills, fever and unexpected weight change. HENT: Negative for congestion. Eyes: Negative for pain and visual disturbance. Respiratory: Positive for cough and shortness of breath. Cardiovascular: Positive for leg swelling. Negative for chest pain. Gastrointestinal: Negative for abdominal pain, diarrhea, nausea and vomiting. Genitourinary: Negative for dysuria. Musculoskeletal: Negative for back pain. Skin: Positive for wound. Negative for rash. Neurological: Negative for headaches. Patient Vitals for the past 12 hrs:   Temp Pulse Resp BP SpO2   05/18/17 1500 - 81 - - -   05/18/17 1226 98.2 °F (36.8 °C) (!) 102 16 (!) 137/117 92 %   05/18/17 1216 - (!) 131 22 145/83 96 %         Physical Exam   Constitutional: He is oriented to person, place, and time. He appears well-developed and well-nourished. Morbidly obese male in mild to moderate distress. HENT:   Head: Normocephalic and atraumatic. Mouth/Throat: Oropharynx is clear and moist.   Eyes: Conjunctivae and EOM are normal. Pupils are equal, round, and reactive to light. Right eye exhibits no discharge. Left eye exhibits no discharge. Neck: Normal range of motion. Neck supple.    Cardiovascular: Normal rate and normal heart sounds. No murmur heard. Pulmonary/Chest: Effort normal. No respiratory distress. He has wheezes (slight, bilaterally). He has no rales. Abdominal: Soft. Bowel sounds are normal. He exhibits no distension. There is no tenderness. Musculoskeletal: Normal range of motion. He exhibits edema (2+ bilateral lower extremities). Neurological: He is alert and oriented to person, place, and time. No cranial nerve deficit. He exhibits normal muscle tone. Skin: Skin is warm and dry. He is not diaphoretic. Bilateral lower extremities with extensive changes consistent with chronic PVD. Left foot amputation of all toes; appears clean and without evidence of infection. Right anterior tibial wound with localized induration, but no evidence of fluctuance. Nursing note and vitals reviewed. MDM  Number of Diagnoses or Management Options  Diagnosis management comments: Pt sent for hypotension, however he currently has normal blood pressure. Rule out pneumonia, failure, monitor for further episodes of hypotension and observe for any evidence of sepsis. Amount and/or Complexity of Data Reviewed  Clinical lab tests: ordered and reviewed  Tests in the radiology section of CPT®: reviewed and ordered  Tests in the medicine section of CPT®: reviewed and ordered  Review and summarize past medical records: yes  Independent visualization of images, tracings, or specimens: yes    Patient Progress  Patient progress: stable    ED Course       Procedures    EKG interpretation: (Preliminary) 12:17 PM  Rhythm: sinus tachycardia and incomplete RBBB. Rate (approx.): 101; Axis: left axis deviation; P wave: normal; QRS interval: normal ; ST/T wave: normal;   Written by Romel Stock. Johnny Elkins ED Scribe, as dictated by Robi Bergman MD.    Progress Note  2:45 PM    Robi Bergman MD has re-evaluated pt, and pt is sitting upright in bed. No feeling of shortness of breath. Heart rate 81.  Blood pressure has remained at 130 since arriving to the ED. Pt afebrile. Urine shows significant infection. Pt has follow up with PCP tomorrow. Will initiate antibiotics and follow up tomorrow for recheck of vital signs and symptoms. LABORATORY TESTS:  Recent Results (from the past 12 hour(s))   EKG, 12 LEAD, INITIAL    Collection Time: 05/18/17 12:17 PM   Result Value Ref Range    Ventricular Rate 101 BPM    Atrial Rate 101 BPM    P-R Interval 208 ms    QRS Duration 110 ms    Q-T Interval 354 ms    QTC Calculation (Bezet) 459 ms    Calculated P Axis 7 degrees    Calculated R Axis -52 degrees    Calculated T Axis 10 degrees    Diagnosis       Sinus tachycardia  Left axis deviation  Incomplete right bundle branch block  Abnormal ECG  When compared with ECG of 07-OCT-2016 11:07,  T wave inversion less evident in Inferior leads     URINALYSIS W/ RFLX MICROSCOPIC    Collection Time: 05/18/17 12:40 PM   Result Value Ref Range    Color YELLOW/STRAW      Appearance TURBID (A) CLEAR      Specific gravity 1.012 1.003 - 1.030      pH (UA) 5.0 5.0 - 8.0      Protein 30 (A) NEG mg/dL    Glucose NEGATIVE  NEG mg/dL    Ketone NEGATIVE  NEG mg/dL    Bilirubin NEGATIVE  NEG      Blood MODERATE (A) NEG      Urobilinogen 0.2 0.2 - 1.0 EU/dL    Nitrites NEGATIVE  NEG      Leukocyte Esterase LARGE (A) NEG      WBC >100 (H) 0 - 4 /hpf    RBC 0-5 0 - 5 /hpf    Epithelial cells FEW FEW /lpf    Bacteria 2+ (A) NEG /hpf    Mucus TRACE (A) NEG /lpf   CBC WITH AUTOMATED DIFF    Collection Time: 05/18/17  1:02 PM   Result Value Ref Range    WBC 7.2 4.1 - 11.1 K/uL    RBC 3.68 (L) 4.10 - 5.70 M/uL    HGB 11.7 (L) 12.1 - 17.0 g/dL    HCT 35.1 (L) 36.6 - 50.3 %    MCV 95.4 80.0 - 99.0 FL    MCH 31.8 26.0 - 34.0 PG    MCHC 33.3 30.0 - 36.5 g/dL    RDW 13.3 11.5 - 14.5 %    PLATELET 501 871 - 968 K/uL    NEUTROPHILS 62 32 - 75 %    LYMPHOCYTES 19 12 - 49 %    MONOCYTES 16 (H) 5 - 13 %    EOSINOPHILS 3 0 - 7 %    BASOPHILS 0 0 - 1 %    ABS. NEUTROPHILS 4.4 1.8 - 8.0 K/UL    ABS. LYMPHOCYTES 1.4 0.8 - 3.5 K/UL    ABS. MONOCYTES 1.2 (H) 0.0 - 1.0 K/UL    ABS. EOSINOPHILS 0.2 0.0 - 0.4 K/UL    ABS. BASOPHILS 0.0 0.0 - 0.1 K/UL   METABOLIC PANEL, COMPREHENSIVE    Collection Time: 05/18/17  1:02 PM   Result Value Ref Range    Sodium 142 136 - 145 mmol/L    Potassium 4.0 3.5 - 5.1 mmol/L    Chloride 107 97 - 108 mmol/L    CO2 27 21 - 32 mmol/L    Anion gap 8 5 - 15 mmol/L    Glucose 192 (H) 65 - 100 mg/dL    BUN 37 (H) 6 - 20 MG/DL    Creatinine 3.46 (H) 0.70 - 1.30 MG/DL    BUN/Creatinine ratio 11 (L) 12 - 20      GFR est AA 21 (L) >60 ml/min/1.73m2    GFR est non-AA 18 (L) >60 ml/min/1.73m2    Calcium 7.9 (L) 8.5 - 10.1 MG/DL    Bilirubin, total 0.7 0.2 - 1.0 MG/DL    ALT (SGPT) 21 12 - 78 U/L    AST (SGOT) 17 15 - 37 U/L    Alk. phosphatase 103 45 - 117 U/L    Protein, total 7.3 6.4 - 8.2 g/dL    Albumin 3.2 (L) 3.5 - 5.0 g/dL    Globulin 4.1 (H) 2.0 - 4.0 g/dL    A-G Ratio 0.8 (L) 1.1 - 2.2     PRO-BNP    Collection Time: 05/18/17  1:02 PM   Result Value Ref Range    NT pro- (H) 0 - 125 PG/ML       IMAGING RESULTS:  CXR Results  (Last 48 hours)               05/18/17 1311  XR CHEST PA LAT Final result    Impression:  Impression: No acute process or change compared to the prior exam.           Narrative:  Exam:  2 view chest       Indication: Dyspnea, hypotension       Comparison to 10/7/2016. PA and lateral views demonstrate normal heart size. There is no acute process in   the lung fields. Degenerative changes are seen in the thoracic spine. MEDICATIONS GIVEN:  Medications   sodium chloride 0.9 % bolus infusion 1,000 mL (not administered)   cefTRIAXone (ROCEPHIN) 1 g in 0.9% sodium chloride (MBP/ADV) 50 mL MBP (not administered)       IMPRESSION:  1. Acute cystitis without hematuria        PLAN:  1.    Current Discharge Medication List      START taking these medications    Details   cephALEXin (KEFLEX) 500 mg capsule Take 2 Caps by mouth two (2) times a day for 7 days. Qty: 28 Cap, Refills: 0           2. Follow-up Information     Follow up With Details Comments Contact Info    \[facility]\"" EMERGENCY DEPT  If symptoms worsen [address] Donnell Marquez, NP Schedule an appointment as soon as possible for a visit in 1 day  [place] [phone]          Return to ED if worse       DISCHARGE NOTE  3:03 PM  The patient has been re-evaluated and is ready for discharge. Reviewed available results with patient. Counseled pt on diagnosis and care plan. Pt has expressed understanding, and all questions have been answered. Pt agrees with plan and agrees to follow up as recommended, or return to the ED if their symptoms worsen. Discharge instructions have been provided and explained to the pt, along with reasons to return to the ED. Attestations: This note is prepared by Osmar Breen. Alana Morrissey, acting as Scribe for Anjel Bone MD.    Anjel Bone MD: The scribe's documentation has been prepared under my direction and personally reviewed by me in its entirety. I confirm that the note above accurately reflects all work, treatment, procedures, and medical decision making performed by me.

## 2017-06-26 ENCOUNTER — OFFICE VISIT (OUTPATIENT)
Dept: ENDOCRINOLOGY | Age: 72
End: 2017-06-26

## 2017-06-26 VITALS
BODY MASS INDEX: 45.1 KG/M2 | HEART RATE: 85 BPM | SYSTOLIC BLOOD PRESSURE: 120 MMHG | WEIGHT: 315 LBS | HEIGHT: 70 IN | DIASTOLIC BLOOD PRESSURE: 71 MMHG

## 2017-06-26 DIAGNOSIS — Z79.4 TYPE 2 DIABETES MELLITUS WITH COMPLICATION, WITH LONG-TERM CURRENT USE OF INSULIN (HCC): Primary | ICD-10-CM

## 2017-06-26 DIAGNOSIS — E11.8 TYPE 2 DIABETES MELLITUS WITH COMPLICATION, WITH LONG-TERM CURRENT USE OF INSULIN (HCC): Primary | ICD-10-CM

## 2017-06-26 DIAGNOSIS — N18.4 CKD (CHRONIC KIDNEY DISEASE) STAGE 4, GFR 15-29 ML/MIN (HCC): ICD-10-CM

## 2017-06-26 DIAGNOSIS — Z89.432 HISTORY OF AMPUTATION OF FOOT, LEFT (HCC): ICD-10-CM

## 2017-06-26 RX ORDER — CALCITRIOL 0.25 UG/1
0.25 CAPSULE ORAL DAILY
COMMUNITY

## 2017-06-26 NOTE — PATIENT INSTRUCTIONS
History of Hyperparathyroidism/ high calcium:    Vitamin D: Continue ergocalciferol - once weekly  Continue calcitriol and follow-up with Dr Hermann Saleem    Diabetes: repeat. - continue glipizide. Stop if you have low glucoses    Cholesterol - continue atorvastatin    Sleep apnea:  Continue treatment.      History of partial amputation of left foot  - will fill out form

## 2017-06-26 NOTE — PROGRESS NOTES
History of Present Illness: Misael Roy is a 71 y.o. male presents for follow-up of diabetes and history of hyperparathyroidism. S/p parathyroid surgery on 9/1/2015 - left inferior adenoma removed. Has severe GREGORIO. Started on bipap machine in February 2016   Also has CKD stage IV - following with Dr Jorge Rizzo. He was last seen over one year ago on 5/2016. Unfortunately, in October/2016, he required partial amputation of his left foot in order to address gangrenous changes. He has healed well since surgery    Diabetes:  Taking glipizide XL 5 mg. Glucoses - has not been monitoring often - 132 on recent check. Just recently received a new meter    Vitamin D deficiency - ergocalciferol once weekly. Dr. Jorge Rizzo recently added calcitriol to help address secondary hyperparathyroidism  Lipids - taking atorvastatin 10 mg. Thyroid nodules - noted incidentally on evaluation for hyperparathyroidism - largest right nodule bx in 8/2014 and returned benign. Social:           Past Medical History:   Diagnosis Date    CAD (coronary artery disease) 2008    MI     Chronic pain     BILAT KNEES    CKD (chronic kidney disease) stage 3, GFR 30-59 ml/min     Diabetes mellitus, type II ([address]     HTN (hypertension)     Hypercalcemia     Ill-defined condition     right calf wound followed by wound care    Morbid obesity (HCC)     Neuropathy     all limbs    Osteoarthritis     Thromboembolus ([address] 2005    BLOOD CLOT TO RIGHT LEG-2005    Thyroid disease     4 CYSTS ON THYROID    Vitamin D deficiency      Current Outpatient Prescriptions   Medication Sig    calcitRIOL (ROCALTROL) 0.25 mcg capsule Take 0.25 mcg by mouth daily.  warfarin (COUMADIN) 3 mg tablet Take 3 mg by mouth daily.  Biotin 2,500 mcg cap Take 5 mg by mouth.     furosemide (LASIX) 40 mg tablet One tablet daily  Indications: Hypertension    atorvastatin (LIPITOR) 10 mg tablet TAKE 1 TABLET EVERY DAY FOR CHOLESTEROL AND TO LOWER CARDIOVASCULAR RISK    glipiZIDE SR (GLUCOTROL) 5 mg CR tablet TAKE 1 TABLET TWICE DAILY    metroNIDAZOLE (METROGEL) 0.75 % topical gel Apply 1 g to affected area daily. To facial erythema for seborrheic dermatitis    ergocalciferol (ERGOCALCIFEROL) 50,000 unit capsule Take 1 Cap by mouth every seven (7) days. For low vitamin D    oxybutynin (DITROPAN) 5 mg tablet Take bid    oxyCODONE-acetaminophen (PERCOCET) 5-325 mg per tablet Take 1 Tab by mouth every four (4) hours as needed. Max Daily Amount: 6 Tabs. No current facility-administered medications for this visit. No Known Allergies    Review of Systems:  - Eyes: no blurry vision or double vision  - Cardiovascular: no chest pain  - Respiratory: no shortness of breath  - Musculoskeletal: no myalgias  - Neurological: no numbness/tingling in extremities    Physical Examination:  Visit Vitals    /71    Pulse 85    Ht 5' 10\" (1.778 m)    Wt 326 lb (147.9 kg)    BMI 46.78 kg/m2   -   - General: pleasant, no distress, uses walker   HEENT: hearing intact, EOMI, clear sclera without icterus  - Cardiovascular: regular, normal rate   - Respiratory: normal effort  - Integumentary: no edema . Diabetic foot exam:     Left: Filament test absent sensation with micro filament   Pulse DP: not palpable   Deformities: Yes - + amputation of distal foot  Right: Filament test absent sensation with micro filament   Pulse DP: absent   Deformities: None  For diabetic shoes/orthotics. Required documentation: I am treating the patient under a comprehensive plan of care for diabetes.  The patient would benefit from diabetic footwear to protect his feet    - Psychiatric: normal mood and affect    Data Reviewed:      Component      Latest Ref Rng & Units 5/18/2017           1:02 PM   Sodium      136 - 145 mmol/L 142   Potassium      3.5 - 5.1 mmol/L 4.0   Chloride      97 - 108 mmol/L 107   CO2      21 - 32 mmol/L 27   Anion gap      5 - 15 mmol/L 8   Glucose      65 - 100 mg/dL 192 (H)   BUN      6 - 20 MG/DL 37 (H)   Creatinine      0.70 - 1.30 MG/DL 3.46 (H)   BUN/Creatinine ratio      12 - 20   11 (L)   GFR est AA      >60 ml/min/1.73m2 21 (L)   GFR est non-AA      >60 ml/min/1.73m2 18 (L)   Calcium      8.5 - 10.1 MG/DL 7.9 (L)   Bilirubin, total      0.2 - 1.0 MG/DL 0.7   ALT (SGPT)      12 - 78 U/L 21   AST      15 - 37 U/L 17   Alk. phosphatase      45 - 117 U/L 103   Protein, total      6.4 - 8.2 g/dL 7.3   Albumin      3.5 - 5.0 g/dL 3.2 (L)   Globulin      2.0 - 4.0 g/dL 4.1 (H)   A-G Ratio      1.1 - 2.2   0.8 (L)       Assessment/Plan:   1. Type 2 diabetes mellitus with complication, with long-term current use of insulin ([address]   Appears controlled based on his reported glucose monitoring. Continue glipizide. - If hypoglycemia occurs, and medication is still needed, would prefer use of DPP for such as Tradjenta    2. History of amputation of foot, left   -Needs form filled out for diabetic footwear. 3. CKD (chronic kidney disease) stage 4, GFR 15-29 ml/min ([facility])   agree with Dr. India Coello management and addition of calcitriol   -Increased risk for hypoglycemia due to longer duration of action of glipizide      Patient Instructions   History of Hyperparathyroidism/ high calcium:    Vitamin D: Continue ergocalciferol - once weekly  Continue calcitriol and follow-up with Dr Suzanne Chatman    Diabetes: repeat. - continue glipizide. Stop if you have low glucoses    Cholesterol - continue atorvastatin    Sleep apnea:  Continue treatment. History of partial amputation of left foot  - will fill out form        Follow-up Disposition:  Return in about 6 months (around 12/26/2017).     Copy sent to:

## 2017-06-26 NOTE — MR AVS SNAPSHOT
Visit Information Date & Time Provider Department Dept. Phone Encounter #  
 6/26/2017  2:10 [facility] [phone] Follow-up Instructions Return in about 6 months (around 12/26/2017). Your Appointments 6/30/2017 10:00 AM  
Any with Shantel Dueñas MD  
[address] [facility] ([facility]) Appt Note: yrly cpap follow up Cornelius [address] [place] [id number] Upcoming Health Maintenance Date Due  
 EYE EXAM RETINAL OR DILATED Q1 3/18/1956 DTaP/Tdap/Td series (1 - Tdap) 3/18/1967 FOBT Q 1 YEAR AGE 50-75 3/18/1996 ZOSTER VACCINE AGE 60> 3/18/2006 GLAUCOMA SCREENING Q2Y 3/18/2011 Pneumococcal 65+ Low/Medium Risk (1 of 2 - PCV13) 3/18/2011 MEDICARE YEARLY EXAM 3/18/2011 MICROALBUMIN Q1 1/19/2017 HEMOGLOBIN A1C Q6M 4/9/2017 FOOT EXAM Q1 5/13/2017 LIPID PANEL Q1 5/13/2017 INFLUENZA AGE 9 TO ADULT 8/1/2017 Allergies as of 6/26/2017  Review Complete On: 6/26/2017 By: Nikki Avendaño MD  
 No Known Allergies Current Immunizations  Never Reviewed No immunizations on file. Not reviewed this visit You Were Diagnosed With   
  
 Codes Comments Type 2 diabetes mellitus with complication, with long-term current use of insulin (Formerly [facility])    -  Primary ICD-10-CM: E11.8, Z79.4 ICD-9-CM: 250.90, V58.67 History of amputation of foot, left     ICD-10-CM: J30.588 ICD-9-CM: V49.73 CKD (chronic kidney disease) stage 4, GFR 15-29 ml/min (Formerly [facility])     ICD-10-CM: N18.4 ICD-9-CM: 133. 4 Vitals BP Pulse Height(growth percentile) Weight(growth percentile) BMI Smoking Status 120/71 85 5' 10\" (1.778 m) 326 lb (147.9 kg) 46.78 kg/m2 Former Smoker Vitals History BMI and BSA Data Body Mass Index Body Surface Area  46.78 kg/m 2 2.7 m 2  
  
  
 Preferred Pharmacy Pharmacy Name Phone Darian Harper [address] [place] [phone] [address] [phone] Your Updated Medication List  
  
   
This list is accurate as of: 6/26/17  2:58 PM.  Always use your most recent med list.  
  
  
  
  
 atorvastatin 10 mg tablet Commonly known as:  LIPITOR  
TAKE 1 TABLET EVERY DAY FOR CHOLESTEROL AND TO LOWER CARDIOVASCULAR RISK Biotin 2,500 mcg Cap Take 5 mg by mouth.  
  
 calcitRIOL 0.25 mcg capsule Commonly known as:  ROCALTROL Take 0.25 mcg by mouth daily. ergocalciferol 50,000 unit capsule Commonly known as:  ERGOCALCIFEROL Take 1 Cap by mouth every seven (7) days. For low vitamin D  
  
 furosemide 40 mg tablet Commonly known as:  LASIX One tablet daily  Indications: Hypertension  
  
 glipiZIDE SR 5 mg CR tablet Commonly known as:  GLUCOTROL XL  
TAKE 1 TABLET TWICE DAILY  
  
 metroNIDAZOLE 0.75 % topical gel Commonly known as:  Bing Shaper Apply 1 g to affected area daily. To facial erythema for seborrheic dermatitis  
  
 oxybutynin 5 mg tablet Commonly known as:  YRAUXUQQ Take bid  
  
 warfarin 3 mg tablet Commonly known as:  COUMADIN Take 3 mg by mouth daily. Follow-up Instructions Return in about 6 months (around 12/26/2017). Patient Instructions History of Hyperparathyroidism/ high calcium: 
 
Vitamin D: Continue ergocalciferol - once weekly Continue calcitriol and follow-up with Dr Luis E Gleason Diabetes: repeat. - continue glipizide. Stop if you have low glucoses Cholesterol - continue atorvastatin Sleep apnea: 
Continue treatment. History of partial amputation of left foot 
- will fill out form Introducing \[facility] [place] [facility]\"" & HEALTH SERVICES! [employer] introduces [employer] patient portal. Now you can access parts of your medical record, email your doctor's office, and request medication refills online.    
 
1. In your internet browser, go to [email or web address] 2. Click on the First Time User? Click Here link in the Sign In box. You will see the New Member Sign Up page. 3. Enter your [employer] Access Code exactly as it appears below. You will not need to use this code after youve completed the sign-up process. If you do not sign up before the expiration date, you must request a new code. · [employer] Access Code: [email or web address] Expires: 9/24/2017  2:58 PM 
 
4. Enter the last four digits of your Social Security Number (xxxx) and Date of Birth (mm/dd/yyyy) as indicated and click Submit. You will be taken to the next sign-up page. 5. Create a [employer] ID. This will be your [employer] login ID and cannot be changed, so think of one that is secure and easy to remember. 6. Create a [employer] password. You can change your password at any time. 7. Enter your Password Reset Question and Answer. This can be used at a later time if you forget your password. 8. Enter your e-mail address. You will receive e-mail notification when new information is available in [address] 9. Click Sign Up. You can now view and download portions of your medical record. 10. Click the Download Summary menu link to download a portable copy of your medical information. If you have questions, please visit the Frequently Asked Questions section of the [employer] website. Remember, [employer] is NOT to be used for urgent needs. For medical emergencies, dial 911. Now available from your iPhone and Android! Please provide this summary of care documentation to your next provider. Your primary care clinician is listed as [address] [place] [address]. If you have any questions after today's visit, please call [phone].

## 2017-07-25 RX ORDER — ERGOCALCIFEROL 1.25 MG/1
CAPSULE ORAL
Qty: 12 CAP | Refills: 3 | Status: SHIPPED | OUTPATIENT
Start: 2017-07-25

## 2017-08-18 ENCOUNTER — DOCUMENTATION ONLY (OUTPATIENT)
Dept: SLEEP MEDICINE | Age: 72
End: 2017-08-18

## 2017-08-18 ENCOUNTER — OFFICE VISIT (OUTPATIENT)
Dept: SLEEP MEDICINE | Age: 72
End: 2017-08-18

## 2017-08-18 VITALS
HEART RATE: 67 BPM | HEIGHT: 70 IN | DIASTOLIC BLOOD PRESSURE: 65 MMHG | WEIGHT: 315 LBS | BODY MASS INDEX: 45.1 KG/M2 | SYSTOLIC BLOOD PRESSURE: 95 MMHG | OXYGEN SATURATION: 92 %

## 2017-08-18 DIAGNOSIS — E66.2 OBESITY HYPOVENTILATION SYNDROME (HCC): Primary | ICD-10-CM

## 2017-08-18 DIAGNOSIS — G47.31 CENTRAL SLEEP APNEA: ICD-10-CM

## 2017-08-18 DIAGNOSIS — I10 ESSENTIAL HYPERTENSION: ICD-10-CM

## 2017-08-18 NOTE — PATIENT INSTRUCTIONS
[address] [place]. [place], [address]  Tel.  [phone]  Fax. 100 [facility] 60  [place], [address] [facility]  Tel.  [phone]  Fax. [phone] [address]  Tel.  [phone]  Fax. [phone]     Learning About CPAP for Sleep Apnea  What is CPAP? CPAP is a small machine that you use at home every night while you sleep. It increases air pressure in your throat to keep your airway open. When you have sleep apnea, this can help you sleep better so you feel much better. CPAP stands for \"continuous positive airway pressure. \"  The CPAP machine will have one of the following:  · A mask that covers your nose and mouth  · Prongs that fit into your nose  · A mask that covers your nose only, the most common type. This type is called NCPAP. The N stands for \"nasal.\"  Why is it done? CPAP is usually the best treatment for obstructive sleep apnea. It is the first treatment choice and the most widely used. Your doctor may suggest CPAP if you have:  · Moderate to severe sleep apnea. · Sleep apnea and coronary artery disease (CAD) or heart failure. How does it help? · CPAP can help you have more normal sleep, so you feel less sleepy and more alert during the daytime. · CPAP may help keep heart failure or other heart problems from getting worse. · NCPAP may help lower your blood pressure. · If you use CPAP, your bed partner may also sleep better because you are not snoring or restless. What are the side effects? Some people who use CPAP have:  · A dry or stuffy nose and a sore throat. · Irritated skin on the face. · Sore eyes. · Bloating. If you have any of these problems, work with your doctor to fix them. Here are some things you can try:  · Be sure the mask or nasal prongs fit well. · See if your doctor can adjust the pressure of your CPAP. · If your nose is dry, try a humidifier.   · If your nose is runny or stuffy, try decongestant medicine or a steroid nasal spray. If these things do not help, you might try a different type of machine. Some machines have air pressure that adjusts on its own. Others have air pressures that are different when you breathe in than when you breathe out. This may reduce discomfort caused by too much pressure in your nose. Where can you learn more? Go to Tapomat.be  Enter Kimberlyn Rojas in the search box to learn more about \"Learning About CPAP for Sleep Apnea. \"   © 4783-8677 Healthwise, [employer]. Care instructions adapted under license by [employer] (which disclaims liability or warranty for this information). This care instruction is for use with your licensed healthcare professional. If you have questions about a medical condition or this instruction, always ask your healthcare professional. [address] any warranty or liability for your use of this information. Content Version: 5.4.72094; Last Revised: January 11, 2010  PROPER SLEEP HYGIENE    What to avoid  · Do not have drinks with caffeine, such as coffee or black tea, for 8 hours before bed. · Do not smoke or use other types of tobacco near bedtime. Nicotine is a stimulant and can keep you awake. · Avoid drinking alcohol late in the evening, because it can cause you to wake in the middle of the night. · Do not eat a big meal close to bedtime. If you are hungry, eat a light snack. · Do not drink a lot of water close to bedtime, because the need to urinate may wake you up during the night. · Do not read or watch TV in bed. Use the bed only for sleeping and sexual activity. What to try  · Go to bed at the same time every night, and wake up at the same time every morning. Do not take naps during the day. · Keep your bedroom quiet, dark, and cool. · Get regular exercise, but not within 3 to 4 hours of your bedtime. .  · Sleep on a comfortable pillow and mattress.   · If watching the clock makes you anxious, turn it facing away from you so you cannot see the time. · If you worry when you lie down, start a worry book. Well before bedtime, write down your worries, and then set the book and your concerns aside. · Try meditation or other relaxation techniques before you go to bed. · If you cannot fall asleep, get up and go to another room until you feel sleepy. Do something relaxing. Repeat your bedtime routine before you go to bed again. · Make your house quiet and calm about an hour before bedtime. Turn down the lights, turn off the TV, log off the computer, and turn down the volume on music. This can help you relax after a busy day. Drowsy Driving: The [facility] 54 cites drowsiness as a causing factor in more than 641,365 police reported crashes annually, resulting in 76,000 injuries and 1,500 deaths. Other surveys suggest 55% of people polled have driven while drowsy in the past year, 23% had fallen asleep but not crashed, 3% crashed, and 2% had and accident due to drowsy driving. Who is at risk? Young Drivers: One study of drowsy driving accidents states that 55% of the drivers were under 25 years. Of those, 75% were male. Shift Workers and Travelers: People who work overnight or travel across time zones frequently are at higher risk of experiencing Circadian Rhythm Disorders. They are trying to work and function when their body is programed to sleep. Sleep Deprived: Lack of sleep has a serious impact on your ability to pay attention or focus on a task. Consistently getting less than the average of 8 hours your body needs creates partial or cumulative sleep deprivation. Untreated Sleep Disorders: Sleep Apnea, Narcolepsy, R.L.S., and other sleep disorders (untreated) prevent a person from getting enough restful sleep. This leads to excessive daytime sleepiness and increases the risk for drowsy driving accidents by up to 7 times.   Medications / Alcohol: Even over the counter medications can cause drowsiness. Medications that impair a drivers attention should have a warning label. Alcohol naturally makes you sleepy and on its own can cause accidents. Combined with excessive drowsiness its effects are amplified. Signs of Drowsy Driving:   * You don't remember driving the last few miles   * You may drift out of your kyler   * You are unable to focus and your thoughts wander   * You may yawn more often than normal   * You have difficulty keeping your eyes open / nodding off   * Missing traffic signs, speeding, or tailgating  Prevention-   Good sleep hygiene, lifestyle and behavioral choices have the most impact on drowsy driving. There is no substitute for sleep and the average person requires 8 hours nightly. If you find yourself driving drowsy, stop and sleep. Consider the sleep hygiene tips provided during your visit as well. Medication Refill Policy: Refills for all medications require 1 week advance notice. Please have your pharmacy fax a refill request. We are unable to fax, or call in \"controled substance\" medications and you will need to pick these prescriptions up from our office. [employer] Activation    Thank you for requesting access to [employer]. Please follow the instructions below to securely access and download your online medical record. [employer] allows you to send messages to your doctor, view your test results, renew your prescriptions, schedule appointments, and more. How Do I Sign Up? 1. In your internet browser, go to [email or web address]. 2. Click on the First Time User? Click Here link in the Sign In box. You will see the New Member Sign Up page. 3. Enter your [employer] Access Code exactly as it appears below. You will not need to use this code after youve completed the sign-up process. If you do not sign up before the expiration date, you must request a new code.     [employer] Access Code: [email or web address]  Expires: 2017  2:58 PM (This is the date your [employer] access code will )    4. Enter the last four digits of your Social Security Number (xxxx) and Date of Birth (mm/dd/yyyy) as indicated and click Submit. You will be taken to the next sign-up page. 5. Create a [employer] ID. This will be your [employer] login ID and cannot be changed, so think of one that is secure and easy to remember. 6. Create a [employer] password. You can change your password at any time. 7. Enter your Password Reset Question and Answer. This can be used at a later time if you forget your password. 8. Enter your e-mail address. You will receive e-mail notification when new information is available in [address] 9. Click Sign Up. You can now view and download portions of your medical record. 10. Click the Download Summary menu link to download a portable copy of your medical information. Additional Information    If you have questions, please call [phone]. Remember, [employer] is NOT to be used for urgent needs. For medical emergencies, dial 911.

## 2017-08-18 NOTE — PROGRESS NOTES
[address] [facility]. [place], [address]  Tel.  [phone]  Fax. 100 [facility] 60  [place], [address] [facility]  Tel.  [phone]  Fax. [phone] [address]  Tel.  [phone]  Fax. [phone]     S>Ashkan Pena is a 71 y.o. male seen for a positive airway pressure follow-up. He reports no problems using the device. He is 86.7% compliant over the past 120 days. The following problems are identified:    Drowsiness no Problems exhaling no   Snoring no Forget to put on no   Mask Comfortable yes Can't fall asleep no   Dry Mouth no Mask falls off no   Air Leaking no Frequent awakenings no       He admits that his sleep has improved. No Known Allergies    He has a current medication list which includes the following prescription(s): vitamin d2, calcitriol, warfarin, biotin, furosemide, atorvastatin, glipizide sr, oxybutynin, and metronidazole. Carie Gibson He  has a past medical history of CAD (coronary artery disease) (2008); Chronic pain; CKD (chronic kidney disease) stage 3, GFR 30-59 ml/min; Diabetes mellitus, type II ([address] HTN (hypertension); Hypercalcemia; Ill-defined condition; Morbid obesity ([address] Neuropathy ([facility] Osteoarthritis; Thromboembolus ([facility] [address] (2005); Thyroid disease; and Vitamin D deficiency. [place] Sleepiness Score: 2   and Modified F.O.S.Q. Score Total / 2: 20   which reflect improved sleep quality over therapy time.     O>    Visit Vitals    BP 95/65    Pulse 67    Ht 5' 10\" (1.778 m)    Wt 318 lb (144.2 kg)    SpO2 92%    BMI 45.63 kg/m2         General:   Not in acute distress   Eyes:  Anicteric sclerae, no obvious strabismus   Nose:  No obvious nasal septum deviation    Oropharynx:   Class 4 oropharyngeal outlet, thick tongue base, uvula not seen due to low-lying soft palate, narrow tonsilo-pharyngeal pilars   Tonsils:   tonsils are not visualized due to low-lying soft palate   Neck:   midline trachea Chest/Lungs:  Equal lung expansion, clear on auscultation    CVS:  Normal rate, regular rhythm; no JVD   Skin:  Warm to touch; no obvious rashes   Neuro:  No focal deficits ; no obvious tremor    Psych:  Normal affect,  normal countenance;           A>    ICD-10-CM ICD-9-CM    1. Obesity hypoventilation syndrome (HCC) E66.2 278.03 AMB SUPPLY ORDER      SLEEP LAB (PAP TITRATION)   2. BMI 45.0-49.9, adult (HCC) Z68.42 V85.42    3. Essential hypertension I10 401.9    4. Central sleep apnea G47.31 780.57 AMB SUPPLY ORDER      SLEEP LAB (PAP TITRATION)     AHI = 101.7. On Bi-Level S/T with AVAPS - See media for download. Compliant:      no    Therapeutic Response:  Negative    P>  Orders Placed This Encounter    AMB SUPPLY ORDER     Diagnosis: (G47.33) GREGORIO (obstructive sleep apnea)  (primary encounter diagnosis)     Replacement Supplies for Positive Airway Pressure Therapy Device:   Duration of need: 99 months.  Oral/Nasal Combo Mask 1 every 3 months.  Oral Cushion Combo Mask (Replace) 2 per month.  Nasal Pillows Combo Mask (Replace) 2 per month.  Full Face Mask 1 every 3 months.  Full Face Mask Cushion 1 per month.  Nasal Cushion (Replace) 2 per month.  Nasal Pillows (Replace) 2 per month.  Nasal Interface Mask 1 every 3 months.  Headgear 1 every 6 months.  Chinstrap 1 every 6 months.  Tubing 1 every 3 months.  Filter(s) Disposable 2 per month.  Filter(s) Non-Disposable 1 every 6 months.  Oral Interface 1 every 3 months. [address] for [employer] (Replace) 1 every 6 months.  Tubing with heating element 1 every 3 months. Perform Mask Fitting per patient preference and comfort - replace as above. Sharon Fitch MD, FAASM; NPI: [id number]    Electronically signed. Date:- 08-18-17.     SLEEP LAB (PAP TITRATION)     Standing Status:   Future     Standing Expiration Date:   2/16/2018     Scheduling Instructions:      Perform Bi-Level S/T with AVAPS titration - starting settings:      Max pressure: 30 cmH2O;       Minimum EPAP: 06 cmH2O; Minimum IPAP: 12; Tidal Volume: Starting 500 ml, Maximum 600 ml; Rate: 06. Increase EPAP to treat Obstructive Apnea. Increase Tidal Volume in increments of 30 ml to treat Hypopneas / Hypoxemia. PERFORM TRANSCUTANEOUS MONITORING DURING TITRATION     Order Specific Question:   Reason for Exam     Answer:   CSA  / OHS       * We have recommended a dedicated weight loss through appropriate diet and an exercise regiment as significant weight reduction has been shown to reduce severity of obstructive sleep apnea. * Follow-up Disposition:  Return in about 1 year (around 8/18/2018), or if symptoms worsen or fail to improve. * He was asked to contact our office for any problems regarding PAP therapy. * Counseling was provided regarding the importance of regular PAP use and on proper sleep hygiene and safe driving. * Re-enforced proper and regular cleaning for the device. Thank you for allowing us to participate in your patient's medical care. James Mena MD, FAASM  Electronically signed.  08/18/17

## 2017-09-07 ENCOUNTER — TELEPHONE (OUTPATIENT)
Dept: ENDOCRINOLOGY | Age: 72
End: 2017-09-07

## 2017-09-08 NOTE — TELEPHONE ENCOUNTER
lvm for patient to call the office back to let him know forms have been faxed to [address] [facility].

## 2017-09-11 NOTE — TELEPHONE ENCOUNTER
----- Message from Chris Bose sent at 9/8/2017  5:10 PM EDT -----  Regarding: Magdalena/Rubin  Patient is returning a call. His number is [phone].

## 2017-09-19 ENCOUNTER — TELEPHONE (OUTPATIENT)
Dept: SLEEP MEDICINE | Age: 72
End: 2017-09-19

## 2017-09-19 NOTE — TELEPHONE ENCOUNTER
Patient has moved to [facility] and is transferring his care to [address] [place] [address] Street: [phone] f: [phone]. Since we have an order on file for a titration, patient was scheduled but had to cancel because of moving. They are requesting his sleep records and an order form filled out that they are faxing our office so they can  care right where we left off. Mary Pitt at [facility] that we will wait for the request for records form and order form to fax back the records.

## 2017-09-20 DIAGNOSIS — I10 ESSENTIAL HYPERTENSION: ICD-10-CM

## 2017-09-21 RX ORDER — FUROSEMIDE 40 MG/1
TABLET ORAL
Qty: 90 TAB | Refills: 2 | Status: SHIPPED | OUTPATIENT
Start: 2017-09-21 | End: 2018-03-17 | Stop reason: SDUPTHER

## 2018-03-17 DIAGNOSIS — I10 ESSENTIAL HYPERTENSION: ICD-10-CM

## 2018-03-17 RX ORDER — FUROSEMIDE 40 MG/1
TABLET ORAL
Qty: 90 TAB | Refills: 2 | Status: SHIPPED | OUTPATIENT
Start: 2018-03-17

## 2018-07-24 RX ORDER — ATORVASTATIN CALCIUM 10 MG/1
TABLET, FILM COATED ORAL
Qty: 90 TAB | Refills: 1 | Status: SHIPPED | OUTPATIENT
Start: 2018-07-24